# Patient Record
Sex: MALE | Race: WHITE | NOT HISPANIC OR LATINO | URBAN - METROPOLITAN AREA
[De-identification: names, ages, dates, MRNs, and addresses within clinical notes are randomized per-mention and may not be internally consistent; named-entity substitution may affect disease eponyms.]

---

## 2024-10-18 ENCOUNTER — LAB REQUISITION (OUTPATIENT)
Dept: DERMATOPATHOLOGY | Facility: CLINIC | Age: 44
End: 2024-10-18
Payer: COMMERCIAL

## 2024-10-18 DIAGNOSIS — L98.9 DISORDER OF THE SKIN AND SUBCUTANEOUS TISSUE, UNSPECIFIED: ICD-10-CM

## 2024-10-18 PROCEDURE — 88321 CONSLTJ&REPRT SLD PREP ELSWR: CPT | Performed by: DERMATOLOGY

## 2024-10-21 LAB
PATH REPORT.FINAL DX SPEC: NORMAL
PATH REPORT.GROSS SPEC: NORMAL
PATH REPORT.RELEVANT HX SPEC: NORMAL
PATH REPORT.TOTAL CANCER: NORMAL
PATHOLOGY SYNOPTIC REPORT: NORMAL

## 2024-10-22 DIAGNOSIS — C43.9 MELANOMA OF SKIN (MULTI): Primary | ICD-10-CM

## 2024-10-28 ENCOUNTER — TUMOR BOARD CONFERENCE (OUTPATIENT)
Dept: HEMATOLOGY/ONCOLOGY | Facility: HOSPITAL | Age: 44
End: 2024-10-28
Payer: COMMERCIAL

## 2024-11-04 NOTE — PROGRESS NOTES
History and Physical    Referring Provider:  Marimar Sterling DO Brian R Cain, MD    Chief Complaint:  Left back melanoma    History of Present Illness:  This is a 44 y.o. male who presents with a Left inferior lateral mid-back with a non-ulcerated superficial spreading melanoma, Breslow of 1.0 mm. The patient noted a lesion in this area for a while, and had a Dermatology visit wherein a total body scan was performed. 2 scalp lesions were cryoablated.    Past Medical History:  Type 2 diabetes -on Ozempic  Colon cancer 2023 stage II    Past Surgical History:  Colon cancer resection 2023.  Dr. aDs.    Medications:  Current Outpatient Medications   Medication Instructions    aspirin 81 mg, Daily   Ozempic    Allergies:  Allergies   Allergen Reactions    Lisinopril Swelling        Family History:  No known family history of malignancies    Social History:   reports that he has never smoked. He has never used smokeless tobacco. He reports that he does not use drugs.  Occasional alcohol use.  Lives in [place] with his wife.  Works in a factory    Review of Systems:  A complete 12 point review of systems was performed and is negative except as noted in the history of present illness.    Vital Signs:  Vitals:    11/05/24 0814   BP: 122/86   Pulse: 78   Resp: 18   Temp: 36.1 °C (97 °F)   SpO2: 100%      Physical Exam:  GEN: No acute distress, Healthy appearing  HEENT: Moist mucus membranes, normocephalic  CARDS: RRR  PULM: No respiratory distress  GI: Soft, non-distended.  Protuberant  LYMPH: No palpable lymphadenopathy in the left axillary and inguinal basins  SKIN: Left back biopsy site without evidence of residual disease.  NEURO: No gross sensorimotor deficits  EXT: No arm or leg swelling         Assessment:  This is a 44 y.o. male who presents with a left back melanoma that was 1.0 mm in Breslow depth and nonulcerated.  He has no clinically palpable lymphadenopathy.  He was recommended for wide excision of the  primary melanoma with a 1 cm margin and consideration of sentinel lymph node biopsy.  After discussion of the risks, benefits, and alternatives he tentatively requests for surgical leroy staging.    Plan:  -- Wide excision left back melanoma with 1 cm margin and sentinel lymph node biopsy.  -- Informed consent obtained  -- Preoperative labs requested  -- The patient asked very appropriate questions that were answered to the best of my ability with the current information at hand. He knows to call with any questions or concerns that arise    Robbie Leon MD, MPH

## 2024-11-04 NOTE — H&P (VIEW-ONLY)
History and Physical    Referring Provider:  Marimar Sterling DO Brian R Cain, MD    Chief Complaint:  Left back melanoma    History of Present Illness:  This is a 44 y.o. male who presents with a Left inferior lateral mid-back with a non-ulcerated superficial spreading melanoma, Breslow of 1.0 mm. The patient noted a lesion in this area for a while, and had a Dermatology visit wherein a total body scan was performed. 2 scalp lesions were cryoablated.    Past Medical History:  Type 2 diabetes -on Ozempic  Colon cancer 2023 stage II    Past Surgical History:  Colon cancer resection 2023.  Dr. Das.    Medications:  Current Outpatient Medications   Medication Instructions    aspirin 81 mg, Daily   Ozempic    Allergies:  Allergies   Allergen Reactions    Lisinopril Swelling        Family History:  No known family history of malignancies    Social History:   reports that he has never smoked. He has never used smokeless tobacco. He reports that he does not use drugs.  Occasional alcohol use.  Lives in [place] with his wife.  Works in a factory    Review of Systems:  A complete 12 point review of systems was performed and is negative except as noted in the history of present illness.    Vital Signs:  Vitals:    11/05/24 0814   BP: 122/86   Pulse: 78   Resp: 18   Temp: 36.1 °C (97 °F)   SpO2: 100%      Physical Exam:  GEN: No acute distress, Healthy appearing  HEENT: Moist mucus membranes, normocephalic  CARDS: RRR  PULM: No respiratory distress  GI: Soft, non-distended.  Protuberant  LYMPH: No palpable lymphadenopathy in the left axillary and inguinal basins  SKIN: Left back biopsy site without evidence of residual disease.  NEURO: No gross sensorimotor deficits  EXT: No arm or leg swelling         Assessment:  This is a 44 y.o. male who presents with a left back melanoma that was 1.0 mm in Breslow depth and nonulcerated.  He has no clinically palpable lymphadenopathy.  He was recommended for wide excision of the  primary melanoma with a 1 cm margin and consideration of sentinel lymph node biopsy.  After discussion of the risks, benefits, and alternatives he tentatively requests for surgical leroy staging.    Plan:  -- Wide excision left back melanoma with 1 cm margin and sentinel lymph node biopsy.  -- Informed consent obtained  -- Preoperative labs requested  -- The patient asked very appropriate questions that were answered to the best of my ability with the current information at hand. He knows to call with any questions or concerns that arise    Robbie Leon MD, MPH

## 2024-11-05 ENCOUNTER — OFFICE VISIT (OUTPATIENT)
Dept: SURGICAL ONCOLOGY | Facility: CLINIC | Age: 44
End: 2024-11-05
Payer: COMMERCIAL

## 2024-11-05 ENCOUNTER — LAB (OUTPATIENT)
Dept: LAB | Facility: LAB | Age: 44
End: 2024-11-05
Payer: COMMERCIAL

## 2024-11-05 VITALS
HEART RATE: 78 BPM | OXYGEN SATURATION: 100 % | SYSTOLIC BLOOD PRESSURE: 122 MMHG | RESPIRATION RATE: 18 BRPM | DIASTOLIC BLOOD PRESSURE: 86 MMHG | TEMPERATURE: 97 F | WEIGHT: 284.39 LBS

## 2024-11-05 DIAGNOSIS — C43.59 MALIGNANT MELANOMA OF BACK (MULTI): Primary | ICD-10-CM

## 2024-11-05 DIAGNOSIS — C43.59 MALIGNANT MELANOMA OF BACK (MULTI): ICD-10-CM

## 2024-11-05 LAB
ANION GAP SERPL CALC-SCNC: 12 MMOL/L (ref 10–20)
BUN SERPL-MCNC: 16 MG/DL (ref 6–23)
CALCIUM SERPL-MCNC: 9.4 MG/DL (ref 8.6–10.6)
CHLORIDE SERPL-SCNC: 103 MMOL/L (ref 98–107)
CO2 SERPL-SCNC: 28 MMOL/L (ref 21–32)
CREAT SERPL-MCNC: 0.81 MG/DL (ref 0.5–1.3)
EGFRCR SERPLBLD CKD-EPI 2021: >90 ML/MIN/1.73M*2
ERYTHROCYTE [DISTWIDTH] IN BLOOD BY AUTOMATED COUNT: 11.6 % (ref 11.5–14.5)
GLUCOSE SERPL-MCNC: 173 MG/DL (ref 74–99)
HCT VFR BLD AUTO: 47.4 % (ref 41–52)
HGB BLD-MCNC: 16.6 G/DL (ref 13.5–17.5)
MCH RBC QN AUTO: 30.3 PG (ref 26–34)
MCHC RBC AUTO-ENTMCNC: 35 G/DL (ref 32–36)
MCV RBC AUTO: 87 FL (ref 80–100)
NRBC BLD-RTO: 0 /100 WBCS (ref 0–0)
PLATELET # BLD AUTO: 150 X10*3/UL (ref 150–450)
POTASSIUM SERPL-SCNC: 4.4 MMOL/L (ref 3.5–5.3)
RBC # BLD AUTO: 5.47 X10*6/UL (ref 4.5–5.9)
SODIUM SERPL-SCNC: 139 MMOL/L (ref 136–145)
WBC # BLD AUTO: 5.7 X10*3/UL (ref 4.4–11.3)

## 2024-11-05 PROCEDURE — 80048 BASIC METABOLIC PNL TOTAL CA: CPT

## 2024-11-05 PROCEDURE — 99205 OFFICE O/P NEW HI 60 MIN: CPT | Performed by: SURGERY

## 2024-11-05 PROCEDURE — 1036F TOBACCO NON-USER: CPT | Performed by: SURGERY

## 2024-11-05 PROCEDURE — 85027 COMPLETE CBC AUTOMATED: CPT

## 2024-11-05 PROCEDURE — 99215 OFFICE O/P EST HI 40 MIN: CPT | Mod: 57 | Performed by: SURGERY

## 2024-11-05 PROCEDURE — 36415 COLL VENOUS BLD VENIPUNCTURE: CPT

## 2024-11-05 RX ORDER — ASPIRIN 81 MG/1
81 TABLET ORAL DAILY
COMMUNITY

## 2024-11-05 ASSESSMENT — ENCOUNTER SYMPTOMS
LOSS OF SENSATION IN FEET: 1
OCCASIONAL FEELINGS OF UNSTEADINESS: 0
DEPRESSION: 0

## 2024-11-05 ASSESSMENT — PAIN SCALES - GENERAL: PAINLEVEL_OUTOF10: 0-NO PAIN

## 2024-11-06 ENCOUNTER — ANESTHESIA EVENT (OUTPATIENT)
Dept: OPERATING ROOM | Facility: HOSPITAL | Age: 44
End: 2024-11-06
Payer: COMMERCIAL

## 2024-11-07 ENCOUNTER — TELEPHONE (OUTPATIENT)
Dept: PREADMISSION TESTING | Facility: HOSPITAL | Age: 44
End: 2024-11-07
Payer: COMMERCIAL

## 2024-11-07 RX ORDER — SEMAGLUTIDE 1.34 MG/ML
1 INJECTION, SOLUTION SUBCUTANEOUS WEEKLY
COMMUNITY

## 2024-11-07 NOTE — TELEPHONE ENCOUNTER
SURGERY PRE-OPERATIVE INSTRUCTIONS    *You will receive a phone call the day before your procedure  after 2pm, (or the Friday before your surgery if scheduled on a Monday.) Generally the hospital will be calling you with this information after that time.    *You are not to eat after midnight the night before the surgery. You may have 8oz of a clear liquid up until 2 hours prior to arriving to the hospital. The exception is with medications you were instructed to take day of surgery.    *You may take tylenol for pain/discomfort as needed.     *Stop taking all aspirin products, ibuprofen (motrin/advil), naproxen (aleve/naprosyn) for one week prior to surgery.    *Stop taking all vitamins and supplements one week prior to surgery.     *You should not have alcoholic beverages for 24 hours before surgery.     *You should not smoke 24 hours prior to surgery.     *To help prevent surgical infections bathe/shower with Dial soap the evening before surgery.    *You can wear deodorant but no lotion, powder, or perfume/cologne. You should remove all make-up and nail polish at home.    *If you wear glasses, please bring a case for the glasses with you.    *You will be asked to remove dentures and contacts.     *Please leave all valuables at home.    *You should wear loose, comfortable clothing that will accommodate bandages and/or casts.    *You should notify your doctor of any change in your condition (fever, cold, rash, etc). Surgery may need to be re-scheduled until a time you are in better health.    *A responsible adult is required to accompany you to and from the hospital if you are receiving anesthesia or a sedative. Patients are not permitted to drive for 24 hours after anesthesia.     *You can use the [employer] parking if you wish.     *If you have any further questions please call [phone].

## 2024-11-08 RX ORDER — SODIUM CHLORIDE, SODIUM LACTATE, POTASSIUM CHLORIDE, CALCIUM CHLORIDE 600; 310; 30; 20 MG/100ML; MG/100ML; MG/100ML; MG/100ML
100 INJECTION, SOLUTION INTRAVENOUS CONTINUOUS
Status: CANCELLED | OUTPATIENT
Start: 2024-11-08 | End: 2024-11-09

## 2024-11-11 ENCOUNTER — PHARMACY VISIT (OUTPATIENT)
Dept: PHARMACY | Facility: CLINIC | Age: 44
End: 2024-11-11
Payer: COMMERCIAL

## 2024-11-11 ENCOUNTER — HOSPITAL ENCOUNTER (OUTPATIENT)
Facility: HOSPITAL | Age: 44
Setting detail: OUTPATIENT SURGERY
Discharge: HOME | End: 2024-11-11
Attending: SURGERY | Admitting: SURGERY
Payer: COMMERCIAL

## 2024-11-11 ENCOUNTER — HOSPITAL ENCOUNTER (OUTPATIENT)
Dept: RADIOLOGY | Facility: HOSPITAL | Age: 44
Setting detail: OUTPATIENT SURGERY
Discharge: HOME | End: 2024-11-11
Payer: COMMERCIAL

## 2024-11-11 ENCOUNTER — ANESTHESIA (OUTPATIENT)
Dept: OPERATING ROOM | Facility: HOSPITAL | Age: 44
End: 2024-11-11
Payer: COMMERCIAL

## 2024-11-11 VITALS
HEART RATE: 72 BPM | BODY MASS INDEX: 35.55 KG/M2 | HEIGHT: 75 IN | WEIGHT: 285.94 LBS | SYSTOLIC BLOOD PRESSURE: 118 MMHG | OXYGEN SATURATION: 98 % | DIASTOLIC BLOOD PRESSURE: 71 MMHG | RESPIRATION RATE: 20 BRPM | TEMPERATURE: 97.3 F

## 2024-11-11 DIAGNOSIS — C43.59 MALIGNANT MELANOMA OF BACK (MULTI): ICD-10-CM

## 2024-11-11 DIAGNOSIS — C43.59 MALIGNANT MELANOMA OF BACK (MULTI): Primary | ICD-10-CM

## 2024-11-11 PROCEDURE — 13101 CMPLX RPR TRUNK 2.6-7.5 CM: CPT | Performed by: SURGERY

## 2024-11-11 PROCEDURE — 38792 RA TRACER ID OF SENTINL NODE: CPT | Performed by: SURGERY

## 2024-11-11 PROCEDURE — 2500000004 HC RX 250 GENERAL PHARMACY W/ HCPCS (ALT 636 FOR OP/ED): Performed by: SURGERY

## 2024-11-11 PROCEDURE — 78195 LYMPH SYSTEM IMAGING: CPT | Performed by: RADIOLOGY

## 2024-11-11 PROCEDURE — 13102 CMPLX RPR TRUNK ADDL 5CM/<: CPT | Performed by: SURGERY

## 2024-11-11 PROCEDURE — A11603 PR EXC SKIN MALIG 2.1-3 CM TRUNK,ARM,LEG: Performed by: ANESTHESIOLOGY

## 2024-11-11 PROCEDURE — 2500000004 HC RX 250 GENERAL PHARMACY W/ HCPCS (ALT 636 FOR OP/ED): Performed by: NURSE ANESTHETIST, CERTIFIED REGISTERED

## 2024-11-11 PROCEDURE — A11603 PR EXC SKIN MALIG 2.1-3 CM TRUNK,ARM,LEG: Performed by: NURSE ANESTHETIST, CERTIFIED REGISTERED

## 2024-11-11 PROCEDURE — 3600000008 HC OR TIME - EACH INCREMENTAL 1 MINUTE - PROCEDURE LEVEL THREE: Performed by: SURGERY

## 2024-11-11 PROCEDURE — 11603 EXC TR-EXT MAL+MARG 2.1-3 CM: CPT | Performed by: SURGERY

## 2024-11-11 PROCEDURE — 2500000004 HC RX 250 GENERAL PHARMACY W/ HCPCS (ALT 636 FOR OP/ED): Performed by: ANESTHESIOLOGY

## 2024-11-11 PROCEDURE — 2720000007 HC OR 272 NO HCPCS: Performed by: SURGERY

## 2024-11-11 PROCEDURE — 3700000002 HC GENERAL ANESTHESIA TIME - EACH INCREMENTAL 1 MINUTE: Performed by: SURGERY

## 2024-11-11 PROCEDURE — 78830 RP LOCLZJ TUM SPECT W/CT 1: CPT

## 2024-11-11 PROCEDURE — A9520 TC99 TILMANOCEPT DIAG 0.5MCI: HCPCS | Performed by: SURGERY

## 2024-11-11 PROCEDURE — 7100000009 HC PHASE TWO TIME - INITIAL BASE CHARGE: Performed by: SURGERY

## 2024-11-11 PROCEDURE — 38525 BIOPSY/REMOVAL LYMPH NODES: CPT | Performed by: SURGERY

## 2024-11-11 PROCEDURE — 7100000002 HC RECOVERY ROOM TIME - EACH INCREMENTAL 1 MINUTE: Performed by: SURGERY

## 2024-11-11 PROCEDURE — 38500 BIOPSY/REMOVAL LYMPH NODES: CPT | Performed by: SURGERY

## 2024-11-11 PROCEDURE — 7100000001 HC RECOVERY ROOM TIME - INITIAL BASE CHARGE: Performed by: SURGERY

## 2024-11-11 PROCEDURE — 3700000001 HC GENERAL ANESTHESIA TIME - INITIAL BASE CHARGE: Performed by: SURGERY

## 2024-11-11 PROCEDURE — RXMED WILLOW AMBULATORY MEDICATION CHARGE

## 2024-11-11 PROCEDURE — 3600000003 HC OR TIME - INITIAL BASE CHARGE - PROCEDURE LEVEL THREE: Performed by: SURGERY

## 2024-11-11 PROCEDURE — 3430000001 HC RX 343 DIAGNOSTIC RADIOPHARMACEUTICALS: Performed by: SURGERY

## 2024-11-11 PROCEDURE — 7100000010 HC PHASE TWO TIME - EACH INCREMENTAL 1 MINUTE: Performed by: SURGERY

## 2024-11-11 RX ORDER — BUPIVACAINE HYDROCHLORIDE 5 MG/ML
INJECTION, SOLUTION PERINEURAL AS NEEDED
Status: DISCONTINUED | OUTPATIENT
Start: 2024-11-11 | End: 2024-11-11 | Stop reason: HOSPADM

## 2024-11-11 RX ORDER — ONDANSETRON HYDROCHLORIDE 2 MG/ML
4 INJECTION, SOLUTION INTRAVENOUS ONCE AS NEEDED
Status: DISCONTINUED | OUTPATIENT
Start: 2024-11-11 | End: 2024-11-11 | Stop reason: HOSPADM

## 2024-11-11 RX ORDER — DROPERIDOL 2.5 MG/ML
0.62 INJECTION, SOLUTION INTRAMUSCULAR; INTRAVENOUS ONCE AS NEEDED
Status: DISCONTINUED | OUTPATIENT
Start: 2024-11-11 | End: 2024-11-11 | Stop reason: HOSPADM

## 2024-11-11 RX ORDER — PHENYLEPHRINE HCL IN 0.9% NACL 0.4MG/10ML
SYRINGE (ML) INTRAVENOUS AS NEEDED
Status: DISCONTINUED | OUTPATIENT
Start: 2024-11-11 | End: 2024-11-11

## 2024-11-11 RX ORDER — ROCURONIUM BROMIDE 10 MG/ML
INJECTION, SOLUTION INTRAVENOUS AS NEEDED
Status: DISCONTINUED | OUTPATIENT
Start: 2024-11-11 | End: 2024-11-11

## 2024-11-11 RX ORDER — ALBUTEROL SULFATE 0.83 MG/ML
2.5 SOLUTION RESPIRATORY (INHALATION) ONCE AS NEEDED
Status: DISCONTINUED | OUTPATIENT
Start: 2024-11-11 | End: 2024-11-11 | Stop reason: HOSPADM

## 2024-11-11 RX ORDER — MEPERIDINE HYDROCHLORIDE 25 MG/ML
12.5 INJECTION INTRAMUSCULAR; INTRAVENOUS; SUBCUTANEOUS EVERY 10 MIN PRN
Status: DISCONTINUED | OUTPATIENT
Start: 2024-11-11 | End: 2024-11-11 | Stop reason: HOSPADM

## 2024-11-11 RX ORDER — OXYCODONE HYDROCHLORIDE 5 MG/1
5 TABLET ORAL EVERY 4 HOURS PRN
Status: DISCONTINUED | OUTPATIENT
Start: 2024-11-11 | End: 2024-11-11 | Stop reason: HOSPADM

## 2024-11-11 RX ORDER — HYDROMORPHONE HYDROCHLORIDE 2 MG/ML
INJECTION, SOLUTION INTRAMUSCULAR; INTRAVENOUS; SUBCUTANEOUS AS NEEDED
Status: DISCONTINUED | OUTPATIENT
Start: 2024-11-11 | End: 2024-11-11

## 2024-11-11 RX ORDER — SUCCINYLCHOLINE CHLORIDE 20 MG/ML
INJECTION INTRAMUSCULAR; INTRAVENOUS AS NEEDED
Status: DISCONTINUED | OUTPATIENT
Start: 2024-11-11 | End: 2024-11-11

## 2024-11-11 RX ORDER — TRAMADOL HYDROCHLORIDE 50 MG/1
50 TABLET ORAL EVERY 6 HOURS PRN
Qty: 8 TABLET | Refills: 0 | Status: SHIPPED | OUTPATIENT
Start: 2024-11-11

## 2024-11-11 RX ORDER — FENTANYL CITRATE 50 UG/ML
INJECTION, SOLUTION INTRAMUSCULAR; INTRAVENOUS AS NEEDED
Status: DISCONTINUED | OUTPATIENT
Start: 2024-11-11 | End: 2024-11-11

## 2024-11-11 RX ORDER — LIDOCAINE HYDROCHLORIDE 40 MG/ML
INJECTION, SOLUTION RETROBULBAR AS NEEDED
Status: DISCONTINUED | OUTPATIENT
Start: 2024-11-11 | End: 2024-11-11

## 2024-11-11 RX ORDER — PROPOFOL 10 MG/ML
INJECTION, EMULSION INTRAVENOUS AS NEEDED
Status: DISCONTINUED | OUTPATIENT
Start: 2024-11-11 | End: 2024-11-11

## 2024-11-11 RX ORDER — MIDAZOLAM HYDROCHLORIDE 1 MG/ML
INJECTION INTRAMUSCULAR; INTRAVENOUS AS NEEDED
Status: DISCONTINUED | OUTPATIENT
Start: 2024-11-11 | End: 2024-11-11

## 2024-11-11 RX ORDER — ONDANSETRON HYDROCHLORIDE 2 MG/ML
INJECTION, SOLUTION INTRAVENOUS AS NEEDED
Status: DISCONTINUED | OUTPATIENT
Start: 2024-11-11 | End: 2024-11-11

## 2024-11-11 RX ORDER — LIDOCAINE HYDROCHLORIDE 10 MG/ML
INJECTION, SOLUTION EPIDURAL; INFILTRATION; INTRACAUDAL; PERINEURAL AS NEEDED
Status: DISCONTINUED | OUTPATIENT
Start: 2024-11-11 | End: 2024-11-11

## 2024-11-11 RX ORDER — SODIUM CHLORIDE, SODIUM LACTATE, POTASSIUM CHLORIDE, CALCIUM CHLORIDE 600; 310; 30; 20 MG/100ML; MG/100ML; MG/100ML; MG/100ML
20 INJECTION, SOLUTION INTRAVENOUS CONTINUOUS
Status: DISCONTINUED | OUTPATIENT
Start: 2024-11-11 | End: 2024-11-11 | Stop reason: HOSPADM

## 2024-11-11 RX ORDER — DIPHENHYDRAMINE HYDROCHLORIDE 50 MG/ML
12.5 INJECTION INTRAMUSCULAR; INTRAVENOUS ONCE AS NEEDED
Status: DISCONTINUED | OUTPATIENT
Start: 2024-11-11 | End: 2024-11-11 | Stop reason: HOSPADM

## 2024-11-11 SDOH — HEALTH STABILITY: MENTAL HEALTH: CURRENT SMOKER: 0

## 2024-11-11 ASSESSMENT — PAIN SCALES - GENERAL
PAINLEVEL_OUTOF10: 0 - NO PAIN

## 2024-11-11 ASSESSMENT — PAIN - FUNCTIONAL ASSESSMENT: PAIN_FUNCTIONAL_ASSESSMENT: 0-10

## 2024-11-11 NOTE — OP NOTE
"*930 INJECTION* WIDE EXCISION LEFT BACK MELANOMA (L), BIOPSY SENTINEL LYMPH NODE AXILARY (L) Operative Note     Date: 2024  OR Location: [facility] OR    Name: Earnest Zepeda \"Arjun\", : 1980, Age: 44 y.o., MRN: [id number], Sex: male    Diagnosis  Pre-op Diagnosis      * Malignant melanoma of back (Multi) [C43.59] Post-op Diagnosis     * Malignant melanoma of back (Multi) [C43.59]     Procedures  *930 INJECTION* WIDE EXCISION LEFT BACK MELANOMA  43126 - RI EXCISION MAL LESION TRUNK/ARM/LEG 2.1-3.0 CM    *930 INJECTION* WIDE EXCISION LEFT BACK MELANOMA  37717 - RI REPAIR COMPLEX TRUNK 2.6-7.5 CM    *930 INJECTION* WIDE EXCISION LEFT BACK MELANOMA  52348 - RI REPAIR COMPLEX TRUNK EACH ADDITIONAL 5 CM/<    BIOPSY SENTINEL LYMPH NODE AXILARY  68164 - RI BX/EXC LYMPH NODE OPEN DEEP AXILLARY NODE    Left Flank In Transit Lymph Node Biopsy [place]  44903 - RI BX/EXC LYMPH NODE OPEN SUPERFICIAL    RI INJ RADIOACTIVE TRACER FOR ID OF SENTINEL NODE [04007]  Surgeons      * Robbie Leon - Primary    Resident/Fellow/Other Assistant:  Surgeons and Role:  * No surgeons found with a matching role *  Valentin Kong MD    Staff:   Circulator: Melissa Guzman Person: Leslie  Surgical Assistant: Jim Guzman Person: [place]    Anesthesia Staff: Anesthesiologist: Ren Crockett MD  CRNA: JAIME Ford-NIKKY    Procedure Summary  Anesthesia: General  ASA: III  Estimated Blood Loss: 5mL  Intra-op Medications: * Intraprocedure medication information is unavailable because the case start and end events have not been set *           Anesthesia Record               Intraprocedure I/O Totals          Intake    lactated Ringer's infusion 800.00 mL    Total Intake 800 mL       Output    Est. Blood Loss 10 mL    Total Output 10 mL       Net    Net Volume 790 mL          Specimen:   ID Type Source Tests Collected by Time   1 : LEFT AXILLARY SENTINEL LYMPH NODE #1 COUNT 1553 Tissue SENTINEL LYMPH NODE MELANOMA DERMPATH LAB- " "DERMATOPATHOLOGY Robbie Leon MD MPH 11/11/2024 1320   2 : LEFT BACK MELANOMA SHORT SUPERIOR 12 O'CLOCK LONG LATERAL 9 O'CLOCK Tissue SKIN WIDE EXCISION DERMPATH LAB- DERMATOPATHOLOGY Robbie Leon MD MPH 11/11/2024 1352   3 : LEFT FLANK INTRANSIT SENTINEL LYMPH NODE COUNT 305 Tissue SENTINEL LYMPH NODE MELANOMA DERMPATH LAB- DERMATOPATHOLOGY Robbie Leon MD MPH 11/11/2024 1405        Findings: Left lower back biopsy site.  Lymphoscintigraphy demonstrating drainage to the left axilla as well as a left flank in-transit lymph node.    Indications: Earnest Zepeda \"Linda" is an 44 y.o. male who is having surgery for Malignant melanoma of back (Multi) [C43.59].  This patient presented with a left lower back melanoma that was 1.0 mm in Breslow depth and nonulcerated.  He was recommended for a wide excision of his primary tumor with a 1 cm margin and consideration of sentinel lymph node biopsy.  After discussing the risks, benefits, alternatives he preferred to pursue surgical leroy staging    The patient was seen in the preoperative area. The risks, benefits, complications, treatment options, non-operative alternatives, expected recovery and outcomes were discussed with the patient. The possibilities of reaction to medication, pulmonary aspiration, injury to surrounding structures, bleeding, recurrent infection, the need for additional procedures, failure to diagnose a condition, and creating a complication requiring transfusion or operation were discussed with the patient. The patient concurred with the proposed plan, giving informed consent.  The site of surgery was properly noted/marked if necessary per policy. The patient has been actively warmed in preoperative area. Preoperative antibiotics have been ordered and given within 1 hours of incision. Venous thrombosis prophylaxis have been ordered including bilateral sequential compression devices    Procedure Details: The patient was brought to the OR and " placed on the OR table in supine position. General anesthesia was induced and an ET tube was placed without difficulty. The patient was positioned supine and the surgical sites were prepped and draped in sterile fashion.    Attention was turned to the left axilla.  An incision was made sharply at the base of the hair-bearing area and dissection carried into the axillary basin using cautery.  1 sentinel lymph node was identified using the gamma probe and excised. Once all sentinel nodes were excised, the basin was irrigated and hemostasis was assured. The wound was then closed in layers with 3-0 vicryl for the subcutaneous and deep dermal layers, and 4-0 monocryl for the skin. Skin glue was used as a dressing.     The patient's position was then changed to right lateral decubitus with all pressure points padded.  He was reprepped and draped in sterile fashion.  Attention was then turned to the left flank in-transit sentinel lymph node.  Using the gamma probe, the position of this was identified.  An incision was made sharply directly over top of this.  Dissection carried through the underlying subcutaneous tissues using electrocautery.  The lymph node was identified and then extirpated using clips and cautery.  Once this was removed, there was no further gamma signal.  The wound was irrigated and hemostasis was assured.  The wound was closed in layers with 3-0 Vicryl for the Megan's and deep dermal layer and 4-0 Monocryl for the skin.  Skin glue was used as a dressing.    At this point, the primary melanoma on the left lower back was addressed. A 1cm margin was marked around the biopsy site. This marking was extended in elliptical fashion resulting in a 3.0 x 9.0 cm marking. The skin was incised sharply and dissection carried to but not through the underlying muscular fascia. The specimen was marked with a short stitch superiorly at 12:00, and a long stitch laterally at 9:00. Skin and subcutaneous flaps were raised  for 1cm in all directions. The wound was irrigated and hemostasis was obtained. The wound was closed in layers with 3-0 vicryl for the scarpas and deep dermal layers, and 4-0 monocryl for the skin. Skin glue was used as a dressing.     Complications:  None; patient tolerated the procedure well.    Disposition: PACU - hemodynamically stable.  Condition: stable     Task Performed by RNFA or Surgical Assistant:  Patient positioning, prepping and draping, exposure        Attending Attestation: I was present and scrubbed for the entire procedure.    Robbie Leon  Phone Number: [phone]

## 2024-11-11 NOTE — ANESTHESIA POSTPROCEDURE EVALUATION
"Patient: Earnest Zepeda \"Arjun\"    Procedure Summary       Date: 11/11/24 Room / Location: [facility] OR 06 / [facility]    Anesthesia Start: 1221 Anesthesia Stop: 1435    Procedures:       *930 INJECTION* WIDE EXCISION LEFT BACK MELANOMA (Left)      BIOPSY SENTINEL LYMPH NODE AXILARY (Left)      Left Flank In Transit Lymph Node Biopsy [place] (Left) Diagnosis:       Malignant melanoma of back (Multi)      (Malignant melanoma of back (Multi) [C43.59])    Surgeons: Robbie Leon MD MPH Responsible Provider: Ren Crockett MD    Anesthesia Type: general ASA Status: 3            Anesthesia Type: general    Vitals Value Taken Time   /86 11/11/24 1431   Temp 36.3 °C (97.3 °F) 11/11/24 1431   Pulse 80 11/11/24 1431   Resp 16 11/11/24 1431   SpO2 99 % 11/11/24 1431       Anesthesia Post Evaluation    Patient location during evaluation: PACU  Patient participation: complete - patient participated  Level of consciousness: awake  Pain management: adequate  Multimodal analgesia pain management approach  Airway patency: patent  Two or more strategies used to mitigate risk of obstructive sleep apnea  Cardiovascular status: acceptable  Respiratory status: acceptable  Hydration status: acceptable  Postoperative Nausea and Vomiting: none        No notable events documented.    "

## 2024-11-11 NOTE — ANESTHESIA PREPROCEDURE EVALUATION
"Patient: Earnest Zepeda \"Arjun\"    Procedure Information       Date/Time: 11/11/24 1215    Procedures:       *930 INJECTION* WIDE EXCISION LEFT BACK MELANOMA (Left)      BIOPSY SENTINEL LYMPH NODE AXILARY (Left)    Location: [facility] OR 06 / [facility]    Surgeons: Robbie Leon MD MPH          Vitals:    11/11/24 0850   BP: (!) 141/105   Pulse: 77   Resp: 18   Temp: 36.1 °C (97 °F)   SpO2: 99%       Past Surgical History:   Procedure Laterality Date    BRONCHOSCOPY      EBUS    COLONOSCOPY  09/25/2024    COLONOSCOPY W/ BIOPSIES  08/31/2023    COLONOSCOPY W/ POLYPECTOMY  08/31/2023    PARTIAL GASTRECTOMY      SHOULDER SURGERY Right     SUBTOTAL COLECTOMY  09/21/2023    TRANSVERSE    WISDOM TOOTH EXTRACTION       Past Medical History:   Diagnosis Date    Cavitating mass in left upper lung lobe 06/10/2022    5 cm    DM (diabetes mellitus), type 2     Malignant melanoma of back (Multi)     OSORIO on CPAP        Current Facility-Administered Medications:     ceFAZolin (Ancef) 3 g in dextrose 5%  mL, 3 g, intravenous, Once, Robbie Leon MD MPH    lactated Ringer's infusion, 20 mL/hr, intravenous, Continuous, Ren Crockett MD  Prior to Admission medications    Medication Sig Start Date End Date Taking? Authorizing Provider   aspirin 81 mg EC tablet Take 1 tablet (81 mg) by mouth once daily.   Yes Historical Provider, MD   semaglutide (Ozempic) 1 mg/dose (2 mg/1.5 mL) pen injector Inject 1 mg under the skin 1 (one) time per week. Last does was 10/31/24    Historical Provider, MD     Allergies   Allergen Reactions    Lisinopril Swelling     Social History     Tobacco Use    Smoking status: Never    Smokeless tobacco: Never   Substance Use Topics    Alcohol use: Yes         Chemistry    Lab Results   Component Value Date/Time     11/05/2024 0930    K 4.4 11/05/2024 0930     11/05/2024 0930    CO2 28 11/05/2024 0930    BUN 16 11/05/2024 0930    CREATININE 0.81 11/05/2024 0930    Lab Results " "  Component Value Date/Time    CALCIUM 9.4 11/05/2024 0930          Lab Results   Component Value Date/Time    WBC 5.7 11/05/2024 0930    HGB 16.6 11/05/2024 0930    HCT 47.4 11/05/2024 0930     11/05/2024 0930     No results found for: \"PROTIME\", \"PTT\", \"INR\"  No results found for this or any previous visit (from the past 4464 hours).  No results found for this or any previous visit from the past 1095 days.      Relevant Problems   No relevant active problems       Clinical information reviewed:   Tobacco  Allergies  Meds   Med Hx  Surg Hx   Fam Hx  Soc Hx        NPO Detail:  NPO/Void Status  Date of Last Liquid: 11/10/24  Time of Last Liquid: 2200  Date of Last Solid: 11/10/24  Time of Last Solid: 2200  Last Intake Type: Clear fluids; Food  Time of Last Void: 0845         Physical Exam    Airway  Mallampati: II     Cardiovascular - normal exam     Dental    Pulmonary    Abdominal            Anesthesia Plan    History of general anesthesia?: yes  History of complications of general anesthesia?: no    ASA 3     general     The patient is not a current smoker.  Patient was not previously instructed to abstain from smoking on day of procedure.  Patient did not smoke on day of procedure.  Education provided regarding risk of obstructive sleep apnea.  intravenous induction   Anesthetic plan and risks discussed with patient.    Plan discussed with CRNA.      "

## 2024-11-11 NOTE — ANESTHESIA PROCEDURE NOTES
Airway  Date/Time: 11/11/2024 12:44 PM  Urgency: elective      Staffing  Performed: CRNA   Authorized by: Ren Crockett MD    Performed by: JAIME Ford-NIKKY  Patient location during procedure: OR    Indications and Patient Condition  Indications for airway management: anesthesia and airway protection  Spontaneous Ventilation: absent  Sedation level: deep  Preoxygenated: yes  Patient position: sniffing  Mask difficulty assessment: 1 - vent by mask    Final Airway Details  Final airway type: endotracheal airway      Successful airway: ETT  Cuffed: yes   Successful intubation technique: video laryngoscopy  Facilitating devices/methods: intubating stylet  Endotracheal tube insertion site: oral  Blade size: #4  ETT size (mm): 7.5  Cormack-Lehane Classification: grade I - full view of glottis  Placement verified by: chest auscultation and capnometry   Inital cuff pressure (cm H2O): 25  Measured from: lips  Number of attempts at approach: 1

## 2024-11-18 LAB
LAB AP ASR DISCLAIMER: NORMAL
LABORATORY COMMENT REPORT: NORMAL
PATH REPORT.FINAL DX SPEC: NORMAL
PATH REPORT.GROSS SPEC: NORMAL
PATH REPORT.MICROSCOPIC SPEC OTHER STN: NORMAL
PATH REPORT.RELEVANT HX SPEC: NORMAL
PATH REPORT.TOTAL CANCER: NORMAL

## 2024-11-25 ENCOUNTER — TUMOR BOARD CONFERENCE (OUTPATIENT)
Dept: HEMATOLOGY/ONCOLOGY | Facility: HOSPITAL | Age: 44
End: 2024-11-25
Payer: COMMERCIAL

## 2024-11-25 DIAGNOSIS — C43.59 MALIGNANT MELANOMA OF BACK (MULTI): Primary | ICD-10-CM

## 2024-11-25 NOTE — TUMOR BOARD NOTE
General Patient Information  Name:  Earnest Zepeda  Evaluation #:  2  Conference Date:  11/25/2024  YOB: 1980  MRN:  [id number]  Program Physician(s):  [facility]  Referring Physician(s):  Marimar Lazaro      Summary   Stage:  cIB ([id number])   Melanoma 5 year survival: 97%    Assessment:  Left inferior lateral mid-back with a non-ulcerated superficial spreading melanoma, Breslow of 1.0 mm.    S/p WLE with 1 cm margins and SLNB, showing no residual melanocytic neoplasm. SLNB shows 2 benign left axillary lymph nodes (0/2). Adequately surgically treated.    Recommendation:  Annual H&P.    Review Multidisciplinary Cutaneous Oncology Conference recommendation with patient.  Continue routine follow up and total body skin exams with Marimar Sterling.    Follow Up:  Marimar Lazaro      History and Physical Exam  Dermatologic History:   44 y.o. male with a biopsy of the left inferior lateral mid back on 10/9/2024 showing a non-ulcerated melanoma, superficial spreading type, Breslow: 1.0 mm.    S/p WLE with 1 cm margins and SLNB on 11/11/2024, showing changes consistent with previous procedure, inked margins free in the planes of sections examined, without residual atypical melanocytic neoplasm seen. SLNB shows 2 left axillary lymph nodes negative for metastatic melanoma (0/2).    Past Medical History:    Past Medical History:   Diagnosis Date    Cavitating mass in left upper lung lobe 06/10/2022    5 cm    DM (diabetes mellitus), type 2     Malignant melanoma of back (Multi)     OSORIO on CPAP        Family History of DNS/MM:  Unknown    Skin:      Lymphatic:        Pathology  Dermatopathology- DERM LAB: [id number]   Collected 11/11/2024 13:20     A. NODE, LEFT AXILLARY SENTINEL LYMPH NODE #1 COUNT 1553, EXCISION:  BENIGN LYMPH NODE WITH TATTOO.     B. SKIN, LEFT BACK MELANOMA SHORT SUPERIOR 12 O'CLOCK LONG LATERAL 9 O'CLOCK, WIDE EXCISION:  CHANGES CONSISTENT WITH PREVIOUS PROCEDURE,  INKED MARGINS FREE IN THE PLANES OF SECTIONS EXAMINED, WITHOUT RESIDUAL ATYPICAL MELANOCYTIC NEOPLASM SEEN.     C. NODE, LEFT FLANK INTRANSIT SENTINEL LYMPH NODE COUNT 305, EXCISION:  BENIGN LYMPH NODE.     ** Electronically signed out by Homa Fritz MD **  ___________________________________________________________________________________________________    Derm Consult: [id number]   Bx: (10/09/2024)     2 SLIDES, [facility], [facility] #[id number] (10/09/2024)     SKIN, LEFT INFERIOR LATERAL MID BACK, SHAVE BIOPSY:  MALIGNANT MELANOMA, PRESENT ON THE DEEP AND PERIPHERAL MARGIN AND DERMAL NEVUS, SEE NOTE.     Note: Microscopic examination reveals a specimen that extends into the mid reticular dermis. There is an asymmetric and poorly circumscribed proliferation of nested and single atypical melanocytes throughout all layers of the epidermis. There are nests of atypical melanocytes in addition to nests of benign appearing melanocytes. The atypical melanocytes have moderately enlarged nuclei with abundant cytoplasm. The atypical melanocytes extend down a hair follicle. There is a nest of atypical melanocytes immediately underlying the hair follicle at a deepest depth of 1.1 mm. In the adjacent dermis the deepest nest is 1.0 mm from the top of the granular layer.      A Breslow depth of 1.0 mm will be used as the deeper nest is underlying the hair follicle and likely represents extension from the adnexa.     ** Electronically signed out by Homa Fritz MD **    SPECIMEN   Procedure  Biopsy, shave   Specimen Laterality  Left   TUMOR   Tumor Site  Skin of trunk: Left inferior lateral mid back        Histologic Type  Superficial spreading melanoma (low-cumulative sun damage (CSD) melanoma)   Maximum Tumor (Breslow) Thickness (Millimeters)  1.0 mm   Ulceration  Not identified   Anatomic (Kevin) Level  IV (melanoma invades reticular dermis)   Mitotic Rate  None identified   Microsatellite(s)  Not identified    Lymphovascular Invasion  Not identified   Neurotropism  Not identified   Tumor-Infiltrating Lymphocytes  Present, nonbrisk   Tumor Regression  Not identified   MARGINS     Margin Status for Invasive Melanoma  All margins negative for invasive melanoma   Distance from Invasive Melanoma to Deep Margin  Less than: 0.1 mm   Margin Status for Melanoma in situ  Melanoma in situ present at margin   Margin(s) Involved by Melanoma in Situ  Peripheral     Deep   PATHOLOGIC STAGE CLASSIFICATION (pTNM, AJCC 8th Edition)     pT Category  pT1b   ADDITIONAL FINDINGS   Additional Findings  Associated nevus: Associated dermal nevus.       Radiology      Clinical Images

## 2024-11-26 ENCOUNTER — OFFICE VISIT (OUTPATIENT)
Dept: SURGICAL ONCOLOGY | Facility: CLINIC | Age: 44
End: 2024-11-26
Payer: COMMERCIAL

## 2024-11-26 VITALS
BODY MASS INDEX: 36.65 KG/M2 | SYSTOLIC BLOOD PRESSURE: 140 MMHG | WEIGHT: 293.21 LBS | HEART RATE: 89 BPM | DIASTOLIC BLOOD PRESSURE: 93 MMHG | RESPIRATION RATE: 16 BRPM | TEMPERATURE: 95.9 F

## 2024-11-26 DIAGNOSIS — C43.59 MALIGNANT MELANOMA OF BACK (MULTI): Primary | ICD-10-CM

## 2024-11-26 PROCEDURE — 99211 OFF/OP EST MAY X REQ PHY/QHP: CPT | Performed by: SURGERY

## 2024-11-26 ASSESSMENT — ENCOUNTER SYMPTOMS
DEPRESSION: 0
OCCASIONAL FEELINGS OF UNSTEADINESS: 0
LOSS OF SENSATION IN FEET: 0

## 2024-11-26 ASSESSMENT — PAIN SCALES - GENERAL: PAINLEVEL_OUTOF10: 0-NO PAIN

## 2024-11-26 NOTE — LETTER
November 26, 2024     Patient: Earnest Zepeda   YOB: 1980   Date of Visit: 11/26/2024       To Whom It May Concern:    It is my medical opinion that Earnest Zepeda may return to work on 12/02/2024 with no restrictions .    If you have any questions or concerns, please don't hesitate to call [phone].         Sincerely,        Robbie Leon MD MPH

## 2024-11-26 NOTE — PROGRESS NOTES
Postoperative visit    Referring Provider:  Marimar Pablo MD    Chief Complaint:  Left back melanoma    History of Present Illness:  This is a 44 y.o. male who presents with a Left inferior lateral mid-back with a non-ulcerated superficial spreading melanoma, Breslow of 1.0 mm. The patient noted a lesion in this area for a while, and had a Dermatology visit wherein a total body scan was performed. 2 scalp lesions were cryoablated.    Surgery 11/11/2024 - Wide excision left back melanoma with 1 cm margin, left axillary and in transit node sentinel lymph node biopsy  Pathology-no residual atypical melanocytic neoplasm; 0 of 2 lymph nodes involved    Postop visit 11/26/2024- No acute issues. Pain is minimal. No swelliing or concerns.   Has follow up with Dr. Sterling in early 2025    Review of Systems:  A complete 12 point review of systems was performed and is negative except as noted in the history of present illness.    Vital Signs:  Vitals:    11/26/24 1104   BP: (!) 140/93   Pulse: 89   Resp: 16   Temp: 35.5 °C (95.9 °F)      Physical Exam:  GEN: No acute distress, Healthy appearing  HEENT: Moist mucus membranes, normocephalic  CARDS: RRR  PULM: No respiratory distress  GI: Soft, non-distended.  Protuberant  LYMPH: No seroma in the left axilla or intransit node site  SKIN: Left back incision is well approxximated without underlying fluid collection or erythema. Has a left forearm scaly lesion ~3mm in size that he will have evaluated by Dermatology  NEURO: No gross sensorimotor deficits  EXT: No arm or leg swelling       FINAL DIAGNOSIS   A. NODE, LEFT AXILLARY SENTINEL LYMPH NODE #1 COUNT 1553, EXCISION:  BENIGN LYMPH NODE WITH TATTOO.     B. SKIN, LEFT BACK MELANOMA SHORT SUPERIOR 12 O'CLOCK LONG LATERAL 9 O'CLOCK, WIDE EXCISION:  CHANGES CONSISTENT WITH PREVIOUS PROCEDURE, INKED MARGINS FREE IN THE PLANES OF SECTIONS EXAMINED, WITHOUT RESIDUAL ATYPICAL MELANOCYTIC NEOPLASM SEEN.     C. NODE, LEFT  FLANK INTRANSIT SENTINEL LYMPH NODE COUNT 305, EXCISION:  BENIGN LYMPH NODE.       Assessment:  This is a 44 y.o. male who presents with a left back melanoma that was 1.0 mm in Breslow depth and nonulcerated.  He has no clinically palpable lymphadenopathy.  He was recommended for wide excision of the primary melanoma with a 1 cm margin and consideration of sentinel lymph node biopsy.  After discussion of the risks, benefits, and alternatives he tentatively requests for surgical leroy staging.    Stage IA    Plan:  -- Recovering well.   -- Follow up primarily with Dr. Sterling in Dermatology for total body skin examinations  -- The patient asked very appropriate questions that were answered to the best of my ability with the current information at hand. He knows to call with any questions or concerns that arise    Robbie Leon MD, MPH

## (undated) DEVICE — PROBE, TRUNODE GAMMA

## (undated) DEVICE — SOLUTION, IRRIGATION, SODIUM CHLORIDE 0.9%, 1000 ML, POUR BOTTLE

## (undated) DEVICE — DRAPE, INSTRUMENT, W/POUCH, STERI DRAPE, 7 X 11 IN, DISPOSABLE, STERILE

## (undated) DEVICE — HEMOSTAT, ARISTA, ABSORBABLE, 3 GRAMS

## (undated) DEVICE — SUTURE, MONOCRYL, 4-0, 18 IN, PS2, UNDYED

## (undated) DEVICE — CAUTERY, PENCIL, PUSH BUTTON, SMOKE EVAC, 70MM

## (undated) DEVICE — APPLIER,  LIGACLIP MULTI CLIP, 30 MED 11 1/2

## (undated) DEVICE — ADHESIVE, SKIN, DERMABOND ADVANCED, 15CM, PEN-STYLE

## (undated) DEVICE — DRESSING, NON-ADHERENT, TELFA, OUCHLESS, 3 X 8 IN, STERILE

## (undated) DEVICE — DRAPE PACK, MINOR, CUSTOM, GEAUGA

## (undated) DEVICE — SUTURE, CTD, VICRYL, 2-0, UND, BR, CT-2

## (undated) DEVICE — DRAPE, TIBURON, SPLIT SHEET, REINF ADH STRIP, 77X122

## (undated) DEVICE — SUTURE, VICRYL, 3-0, 27 IN, SH

## (undated) DEVICE — SUTURE, SILK, 2-0, 30 IN, SH, BLACK

## (undated) DEVICE — APPLICATOR, CHLORAPREP, W/ORANGE TINT, 26ML

## (undated) DEVICE — DISSECTOR, EXACT, LIGASURE